# Patient Record
Sex: FEMALE | Race: BLACK OR AFRICAN AMERICAN
[De-identification: names, ages, dates, MRNs, and addresses within clinical notes are randomized per-mention and may not be internally consistent; named-entity substitution may affect disease eponyms.]

---

## 2019-10-03 ENCOUNTER — HOSPITAL ENCOUNTER (OUTPATIENT)
Dept: HOSPITAL 62 - WI | Age: 64
End: 2019-10-03
Attending: FAMILY MEDICINE
Payer: MEDICARE

## 2019-10-03 DIAGNOSIS — Z12.31: Primary | ICD-10-CM

## 2019-10-03 PROCEDURE — 77063 BREAST TOMOSYNTHESIS BI: CPT

## 2019-10-03 PROCEDURE — 77067 SCR MAMMO BI INCL CAD: CPT

## 2019-10-03 NOTE — WOMENS IMAGING REPORT
EXAM DESCRIPTION:  3D SCREENING MAMMO BILAT



COMPLETED DATE/TIME:  10/3/2019 8:04 am



REASON FOR STUDY:  Z12.31 ENCOUNTER FOR SCREENING MAMMOGRAM FOR MALIGNANT NEOPLASM OF BREAST Z12.31  
ENCNTR SCREEN MAMMOGRAM FOR MALIGNANT NEOPLASM OF JUAN



COMPARISON:  Multiple since 2010



EXAM PARAMETERS:  Views: Standard craniocaudal and mediolateral oblique views of each breast recorded
 using digital acquisition and breast tomosynthesis.

Read with the assistance of CAD.

.[facility] - R2  Version 9.2



LIMITATIONS:  None.



FINDINGS:  No suspicious masses, suspicious calcifications or architectural distortion. No areas of c
oncern.



IMPRESSION:   NEGATIVE MAMMOGRAM. BIRADS 1.



BREAST DENSITY:  b. There are scattered areas of fibroglandular density.



BIRAD:  ASSESSMENT:  1 NEGATIVE



RECOMMENDATION:  ROUTINE SCREENING

Please continue yearly bilateral screening mammography/tomosynthesis in October 2020



COMMENT:  The patient has been notified of the results by letter per MQSA requirements. Additional no
tification policies are in place for contacting patient with suspicious or incomplete findings.

Quality ID #225: The American College of Radiology recommends an annual screening mammogram for women
 aged 40 years or over. This facility utilizes a reminder system to ensure that all patients receive 
reminder letters, and/or direct phone calls for appointments. This includes reminders for routine scr
eening mammograms, diagnostic mammograms, or other Breast Imaging Interventions when appropriate.  Th
is patient will be placed in the appropriate reminder system.



TECHNICAL DOCUMENTATION:  FINDING NUMBER: (1)

ASSESSMENT:  (1)

JOB ID:  [id number]

 2011 "[email or web address]- All Rights Reserved



Reading location - IP/workstation name: [email or web address]

## 2019-11-17 ENCOUNTER — HOSPITAL ENCOUNTER (EMERGENCY)
Dept: HOSPITAL 62 - ER | Age: 64
Discharge: HOME | End: 2019-11-17
Payer: MEDICARE

## 2019-11-17 VITALS — SYSTOLIC BLOOD PRESSURE: 147 MMHG | DIASTOLIC BLOOD PRESSURE: 88 MMHG

## 2019-11-17 DIAGNOSIS — R29.898: Primary | ICD-10-CM

## 2019-11-17 LAB
ADD MANUAL DIFF: NO
ALBUMIN SERPL-MCNC: 3.9 G/DL (ref 3.5–5)
ALP SERPL-CCNC: 95 U/L (ref 38–126)
ANION GAP SERPL CALC-SCNC: 8 MMOL/L (ref 5–19)
APPEARANCE UR: (no result)
APTT PPP: YELLOW S
AST SERPL-CCNC: 24 U/L (ref 14–36)
BASOPHILS # BLD AUTO: 0 10^3/UL (ref 0–0.2)
BASOPHILS NFR BLD AUTO: 0.6 % (ref 0–2)
BILIRUB DIRECT SERPL-MCNC: 0.1 MG/DL (ref 0–0.4)
BILIRUB SERPL-MCNC: 0.4 MG/DL (ref 0.2–1.3)
BILIRUB UR QL STRIP: NEGATIVE
BUN SERPL-MCNC: 15 MG/DL (ref 7–20)
CALCIUM: 9.1 MG/DL (ref 8.4–10.2)
CHLORIDE SERPL-SCNC: 104 MMOL/L (ref 98–107)
CO2 SERPL-SCNC: 31 MMOL/L (ref 22–30)
EOSINOPHIL # BLD AUTO: 0.1 10^3/UL (ref 0–0.6)
EOSINOPHIL NFR BLD AUTO: 2 % (ref 0–6)
ERYTHROCYTE [DISTWIDTH] IN BLOOD BY AUTOMATED COUNT: 16.4 % (ref 11.5–14)
GLUCOSE SERPL-MCNC: 90 MG/DL (ref 75–110)
GLUCOSE UR STRIP-MCNC: NEGATIVE MG/DL
HCT VFR BLD CALC: 34.7 % (ref 36–47)
HGB BLD-MCNC: 10.8 G/DL (ref 12–15.5)
KETONES UR STRIP-MCNC: NEGATIVE MG/DL
LYMPHOCYTES # BLD AUTO: 1.5 10^3/UL (ref 0.5–4.7)
LYMPHOCYTES NFR BLD AUTO: 27.6 % (ref 13–45)
MCH RBC QN AUTO: 22.6 PG (ref 27–33.4)
MCHC RBC AUTO-ENTMCNC: 31.3 G/DL (ref 32–36)
MCV RBC AUTO: 72 FL (ref 80–97)
MONOCYTES # BLD AUTO: 0.5 10^3/UL (ref 0.1–1.4)
MONOCYTES NFR BLD AUTO: 9.7 % (ref 3–13)
NEUTROPHILS # BLD AUTO: 3.4 10^3/UL (ref 1.7–8.2)
NEUTS SEG NFR BLD AUTO: 60.1 % (ref 42–78)
NITRITE UR QL STRIP: NEGATIVE
PH UR STRIP: 7 [PH] (ref 5–9)
PLATELET # BLD: 293 10^3/UL (ref 150–450)
POTASSIUM SERPL-SCNC: 3.7 MMOL/L (ref 3.6–5)
PROT SERPL-MCNC: 7.5 G/DL (ref 6.3–8.2)
PROT UR STRIP-MCNC: NEGATIVE MG/DL
RBC # BLD AUTO: 4.8 10^6/UL (ref 3.72–5.28)
SP GR UR STRIP: 1.02
TOTAL CELLS COUNTED % (AUTO): 100 %
UROBILINOGEN UR-MCNC: 2 MG/DL (ref ?–2)
WBC # BLD AUTO: 5.6 10^3/UL (ref 4–10.5)

## 2019-11-17 PROCEDURE — 81001 URINALYSIS AUTO W/SCOPE: CPT

## 2019-11-17 PROCEDURE — 36415 COLL VENOUS BLD VENIPUNCTURE: CPT

## 2019-11-17 PROCEDURE — 80053 COMPREHEN METABOLIC PANEL: CPT

## 2019-11-17 PROCEDURE — 99283 EMERGENCY DEPT VISIT LOW MDM: CPT

## 2019-11-17 PROCEDURE — 85025 COMPLETE CBC W/AUTO DIFF WBC: CPT

## 2019-11-17 NOTE — ER DOCUMENT REPORT
HPI





- HPI


Patient complains to provider of: hand discoloration


Time Seen by Provider: 11/17/19 17:43


Onset: Yesterday


Onset/Duration: Sudden


Quality of pain: No pain


Pain Level: Denies


Context: 





64-year-old female with history of prediabetes presents to the emergency de

partment with reports that her hands are discolored.  The palms of her hands are

discolored, orange color.  She denies using make-up, crafts, digging in the 

dirt.  She reports she has not been around any kind of dyes.  She denies pain.  

Denies pruritus.  Denies recent new meds.  Denies pain with void.  Denies fever 

vomiting diarrhea.  Cap refills less than 3 seconds good radial pulse 

bilaterally.





Associated Symptoms: None


Exacerbated by: Denies


Relieved by: Denies


Similar symptoms previously: No


Recently seen / treated by doctor: No





- REPRODUCTIVE


Reproductive: DENIES: Pregnant:





Past Medical History





- General


Information source: Patient





- Social History


Smoking Status: Never Smoker


Frequency of alcohol use: None


Drug Abuse: None


Lives with: Family


Family History: None


Patient has suicidal ideation: No


Patient has homicidal ideation: No


Endocrine Medical History: Reports: Other - pre diabetic


Surgical Hx: Negative





Vertical Provider Document





- CONSTITUTIONAL


Agree With Documented VS: Yes


Exam Limitations: No Limitations


General Appearance: WD/WN, No Apparent Distress





- INFECTION CONTROL


TRAVEL OUTSIDE OF THE [place] IN LAST 30 DAYS: No





- HEENT


HEENT: Atraumatic, Normocephalic.  negative: Conjuctival Injection





- NECK


Neck: Supple





- RESPIRATORY


Respiratory: Breath Sounds Normal, No Respiratory Distress





- CARDIOVASCULAR


Cardiovascular: Regular Rate





- MUSCULOSKELETAL/EXTREMETIES


Musculoskeletal/Extremeties: MAEW, FROM, Non-Tender - bilateral palms orangish 

colored, bilateral cap refills less than 2 seconds bilateral radial pulses equal

strong.  Flexes and extends hand good 





- NEURO


Level of Consciousness: Awake, Alert, Appropriate


Motor/Sensory: No Motor Deficit





- DERM


Integumentary: Warm, Dry, No Rash





Course





- Re-evaluation


Re-evalutation: 





11/17/19 19:09


64-year-old female presents emergency department with reports of discoloration 

of both her palms.  Reports they were purple yesterday today they are orange.  

She denies working with any kind of new makeup, any type of dyes or digging in 

the dirt.  We did have her remove her her rings and there seemed to be a dark 

edge around the outer part of her rings.  Patient denies type of symptoms such 

as fever vomiting diarrhea.  Denies new medications.  Will evaluate labs.


11/17/19 19:18


Patient was updated on labs.  Patient did admit to eating a carrot casserole 

last night but reports she did not help make it.  Patient was instructed to 

follow-up with Dr. Bray this week for recheck and return for any concerns.  

She verbalized understanding to all instructions.











Dictation of this chart was performed using voice recognition software; 

therefore, there may be some unintended grammatical errors.


11/17/19 19:18


                                        





                                 11/17/19 17:59 





                                 11/17/19 17:59 





                                        











MCV  72 fl (80-97)  L  11/17/19  17:59    


 


MCH  22.6 pg (27.0-33.4)  L  11/17/19  17:59    


 


MCHC  31.3 g/dL (32.0-36.0)  L  11/17/19  17:59    


 


RDW  16.4 % (11.5-14.0)  H  11/17/19  17:59    


 


Seg Neutrophils %  60.1 % (42-78)   11/17/19  17:59    


 


Chloride  104 mmol/L ()   11/17/19  17:59    


 


Carbon Dioxide  31 mmol/L (22-30)  H  11/17/19  17:59    


 


Anion Gap  8  (5-19)   11/17/19  17:59    


 


Est GFR ( Amer)  > 60  (>60)   11/17/19  17:59    


 


Glucose  90 mg/dL ()   11/17/19  17:59    


 


Calcium  9.1 mg/dL (8.4-10.2)   11/17/19  17:59    


 


Total Bilirubin  0.4 mg/dL (0.2-1.3)   11/17/19  17:59    


 


AST  24 U/L (14-36)   11/17/19  17:59    


 


Alkaline Phosphatase  95 U/L ()   11/17/19  17:59    


 


Total Protein  7.5 g/dL (6.3-8.2)   11/17/19  17:59    


 


Albumin  3.9 g/dL (3.5-5.0)   11/17/19  17:59    


 


Urine Color  YELLOW   11/17/19  18:37    


 


Urine Appearance  SLIGHTLY-CLOUDY   11/17/19  18:37    


 


Urine pH  7.0  (5.0-9.0)   11/17/19  18:37    


 


Ur Specific Gravity  1.024   11/17/19  18:37    


 


Urine Protein  NEGATIVE mg/dL (NEGATIVE)   11/17/19  18:37    


 


Urine Glucose (UA)  NEGATIVE mg/dL (NEGATIVE)   11/17/19  18:37    


 


Urine Ketones  NEGATIVE mg/dL (NEGATIVE)   11/17/19  18:37    


 


Urine Blood  NEGATIVE  (NEGATIVE)   11/17/19  18:37    


 


Urine Nitrite  NEGATIVE  (NEGATIVE)   11/17/19  18:37    


 


Ur Leukocyte Esterase  NEGATIVE  (NEGATIVE)   11/17/19  18:37    


 


Urine WBC (Auto)  1 /HPF  11/17/19  18:37    


 


Urine RBC (Auto)  1 /HPF  11/17/19  18:37    














- Laboratory


Result Diagrams: 


                                 11/17/19 17:59





                                 11/17/19 17:59





Discharge





- Discharge


Clinical Impression: 


 Palm discoloration





Condition: Stable


Disposition: HOME, SELF-CARE


Additional Instructions: 


*You have been evaluated for discoloration of your palms


*Follow up with your primary care provider within the next week


*Return to ED for worsening condition, changes, needs





Referrals: 


REAGAN BRAY MD [Primary Care Provider] - Follow up in 1 week

## 2019-11-17 NOTE — ER DOCUMENT REPORT
ED Medical Screen (RME)





- General


Chief Complaint: Hand Pain


Stated Complaint: HAND DISCOLORATION


Time Seen by Provider: 11/17/19 17:43


Primary Care Provider: 


REAGAN BRAY MD [Primary Care Provider] - Follow up as needed


Mode of Arrival: Ambulatory


Information source: Patient


Notes: 





64-year-old female with history of prediabetes presents to the emergency 

department with reports that her hands are discolored.  The palms of her hands 

are discolored, orange color.  She denies using make-up, crafts, digging in the 

dirt.  She reports she has not been around any kind of dyes.  She denies pain.  

Denies pruritus.  Denies recent new meds.  Denies pain with void.  Denies fever 

vomiting diarrhea.  Cap refills less than 3 seconds good radial pulse 

bilaterally.











I have greeted and performed a rapid initial assessment of this patient.  A 

comprehensive ED assessment and evaluation of the patient, analysis of test 

results and completion of the medical decision making process will be conducted 

by additional ED providers.  Dictation of this chart was performed using voice 

recognition software; therefore, there may be some unintended grammatical 

errors.


TRAVEL OUTSIDE OF THE [place] IN LAST 30 DAYS: No





- Related Data


Home Medications: metformin





Past Medical History





- Social History


Frequency of alcohol use: None


Drug Abuse: None





Doctor's Discharge





- Discharge


Referrals: 


REAGAN BRAY MD [Primary Care Provider] - Follow up as needed

## 2020-10-05 ENCOUNTER — HOSPITAL ENCOUNTER (OUTPATIENT)
Dept: HOSPITAL 62 - SP | Age: 65
End: 2020-10-05
Attending: FAMILY MEDICINE
Payer: MEDICARE

## 2020-10-05 DIAGNOSIS — Z12.31: Primary | ICD-10-CM

## 2020-10-05 DIAGNOSIS — N63.21: ICD-10-CM

## 2020-10-05 PROCEDURE — 77063 BREAST TOMOSYNTHESIS BI: CPT

## 2020-10-05 PROCEDURE — 77067 SCR MAMMO BI INCL CAD: CPT

## 2020-10-05 NOTE — WOMENS IMAGING REPORT
EXAM DESCRIPTION:  3D SCREENING MAMMO BILAT



IMAGES COMPLETED DATE/TIME:  10/5/2020 10:27 am



REASON FOR STUDY:  Z12.31 ENCNTR SCREEN MAMMOGRAM FOR MALIGNANT NEOPLASM OF BREAST Z12.31  ENCNTR SCR
EEN MAMMOGRAM FOR MALIGNANT NEOPLASM OF JUAN



COMPARISON:  Digital tomosynthesis bilateral screening mammogram dated 10/3/2019 and digital bilatera
l screening mammograms dated 10/2/2018, 9/13/2017 and 9/12/2016.



EXAM PARAMETERS:  Standard craniocaudal and mediolateral oblique views of each breast recorded using 
digital acquisition and breast tomosynthesis.

Read with the assistance of CAD.

.[facility] [employer]  Version 9.2



LIMITATIONS:  None.



FINDINGS:  RIGHT BREAST

MASSES: No suspicious masses.

CALCIFICATIONS: No new or suspicious calcifications.

ARCHITECTURAL DISTORTION: None.

ASYMMETRY: None noted.

OTHER: No other significant findings.

LEFT BREAST

MASSES:  The margins of the mass in the upper-outer quadrant of the breast, posterior depth, approxim
ately 12.6 cm from the nipple, appears slightly lobulated on the current examination when compared to
 the prior studies.

CALCIFICATIONS: No new or suspicious calcifications.

ARCHITECTURAL DISTORTION: None.

ASYMMETRY: None noted.

OTHER: No other significant findings.



IMPRESSION:  1.  The margins of the mass in the upper-outer quadrant of the Left breast a slightly lo
bulated in appearance when compared to the prior examinations.

0 Incomplete: Needs Additional Imaging Evaluation and/or prior Mammograms for Comparison.



BREAST DENSITY:  b. There are scattered areas of fibroglandular density.



BIRAD:  ASSESSMENT:  0 Incomplete:  Needs Additional Imaging Evaluation and/or prior Mammograms for C
omparison.



RECOMMENDATION:  1.  Left breast ultrasound.

The patient will be contacted for additional imaging.



COMMENT:  The patient has been notified of the results by letter per MQSA requirements. Additional no
tification policies are in place for contacting patient with suspicious or incomplete findings.

Quality ID #225: The American College of Radiology recommends an annual screening mammogram for women
 aged 40 years or over. This facility utilizes a reminder system to ensure that all patients receive 
reminder letters, and/or direct phone calls for appointments. This includes reminders for routine scr
eening mammograms, diagnostic mammograms, or other Breast Imaging Interventions when appropriate.  Th
is patient will be placed in the appropriate reminder system.



TECHNICAL DOCUMENTATION:  FINDING NUMBER: (1)

ASSESSMENT: (1)

JOB ID:  [id number]

 2011 [employer]- All Rights Reserved



Reading location - IP/workstation name: [email or web address]

## 2020-10-12 ENCOUNTER — HOSPITAL ENCOUNTER (OUTPATIENT)
Dept: HOSPITAL 62 - WI | Age: 65
End: 2020-10-12
Attending: FAMILY MEDICINE
Payer: MEDICARE

## 2020-10-12 DIAGNOSIS — R92.2: Primary | ICD-10-CM

## 2020-10-12 PROCEDURE — 76642 ULTRASOUND BREAST LIMITED: CPT

## 2020-10-12 NOTE — WOMENS IMAGING REPORT
EXAM DESCRIPTION:  U/S BREAST UNILAT LIMITED



IMAGES COMPLETED DATE/TIME:  10/12/2020 11:04 am



REASON FOR STUDY:  R92.2 INCONCLUSIVE MAMMOGRAM R92.2  INCONCLUSIVE MAMMOGRAM



COMPARISON:  Mammogram 10/5/2020



TECHNIQUE:  Real-time and static grayscale imaging performed of the left breast targeted to the area 
of clinical/mammographic concern. Selected color Doppler images recorded.



LIMITATIONS:  None.



FINDINGS:  MASS: Several physiologic lymph nodes, the largest 3.0 x 0.7 x 3.1 cm.  No suspicious find
ings.

OTHER: No other significant finding.



IMPRESSION:  No suspicious findings detected by ultrasound.



BIRAD:  2 Benign findings.



RECOMMENDATION:  RECOMMENDED FOLLOW-UP: Annual mammographic follow-up.



COMMENT:  The American College of Radiology (ACR) has developed recommendations for screening MRI of 
the breasts in certain patient populations, to be used in conjunction with mammography.  Breast MRI s
urveillance may be appropriate for women with more than 20% lifetime risk of developing breast cancer
  as determined by genetic testing, significant family history of the disease, or history of mantle r
adiation for Hodgkins Disease.  ACR Practice Guidelines 2008.



TECHNICAL DOCUMENTATION:  JOB ID:  [id number]

 2011 [employer]- All Rights Reserved



Reading location - IP/workstation name: [email or web address]